# Patient Record
Sex: FEMALE | Race: WHITE | NOT HISPANIC OR LATINO | URBAN - METROPOLITAN AREA
[De-identification: names, ages, dates, MRNs, and addresses within clinical notes are randomized per-mention and may not be internally consistent; named-entity substitution may affect disease eponyms.]

---

## 2022-06-17 ENCOUNTER — APPOINTMENT (RX ONLY)
Dept: URBAN - METROPOLITAN AREA CLINIC 55 | Facility: CLINIC | Age: 32
Setting detail: DERMATOLOGY
End: 2022-06-17

## 2022-06-17 DIAGNOSIS — Z41.9 ENCOUNTER FOR PROCEDURE FOR PURPOSES OTHER THAN REMEDYING HEALTH STATE, UNSPECIFIED: ICD-10-CM

## 2022-06-17 PROCEDURE — ? COSMETIC CONSULTATION: GENERAL

## 2022-06-17 PROCEDURE — ? PRESCRIPTION

## 2022-06-22 ENCOUNTER — APPOINTMENT (RX ONLY)
Dept: URBAN - METROPOLITAN AREA CLINIC 55 | Facility: CLINIC | Age: 32
Setting detail: DERMATOLOGY
End: 2022-06-22

## 2022-06-22 DIAGNOSIS — Z41.9 ENCOUNTER FOR PROCEDURE FOR PURPOSES OTHER THAN REMEDYING HEALTH STATE, UNSPECIFIED: ICD-10-CM

## 2022-06-22 PROCEDURE — ? PALOMAR VECTUS LASER HAIR REMOVAL

## 2022-06-22 NOTE — PROCEDURE: PALOMAR VECTUS LASER HAIR REMOVAL
Post-Care Instructions: I reviewed with the patient in detail post-care instructions. Patient should avoid sun for a minimum of 4 weeks before and after treatment.
Cooling: DCD setting
Optic Size: 12 x 12mm
Rate (Hz): Medium
Render Post-Care In The Note: No
Topical Anesthesia Type: 20% benzocaine, 8% lidocaine, 4% tetracaine
Pulse Duration (Include Units): 19 ms
Post-Procedure Care: Immediate endpoint: perifollicular erythema and edema. Post care was reviewed with the patient and all patient questions were answered to their satisfaction.
Skintel Number (Optional): 17
Treatment Number: 1
Laser Type: diode 810nm
Detail Level: Detailed
Skintel Number (Optional): 15
Consent: Verbal consent obtained, risks reviewed including but not limited to crusting, scabbing, blistering, scarring, darker or lighter pigmentary change, paradoxical hair regrowth, incomplete removal of hair and infection.
Pre-Procedure: Prior to proceeding the treatment areas were cleaned and all present put on their eye protection.
External Cooling Fan Speed: 0
Fluence In J/Cm2: 29
Location Override: [email or web address]

## 2022-08-03 ENCOUNTER — APPOINTMENT (RX ONLY)
Dept: URBAN - METROPOLITAN AREA CLINIC 55 | Facility: CLINIC | Age: 32
Setting detail: DERMATOLOGY
End: 2022-08-03

## 2022-08-03 DIAGNOSIS — Z41.9 ENCOUNTER FOR PROCEDURE FOR PURPOSES OTHER THAN REMEDYING HEALTH STATE, UNSPECIFIED: ICD-10-CM

## 2022-08-03 PROCEDURE — ? INVENTORY

## 2022-08-03 PROCEDURE — ? PALOMAR VECTUS LASER HAIR REMOVAL

## 2022-08-03 NOTE — PROCEDURE: PALOMAR VECTUS LASER HAIR REMOVAL
Topical Anesthesia Type: 20% benzocaine, 8% lidocaine, 4% tetracaine
Detail Level: Detailed
Optic Size: 12 x 12mm
Pulse Duration (Include Units): 14 ms
Post-Care Instructions: I reviewed with the patient in detail post-care instructions. Patient should avoid sun for a minimum of 4 weeks before and after treatment.
Pre-Procedure: Prior to proceeding the treatment areas were cleaned and all present put on their eye protection.
External Cooling Fan Speed: 0
Post-Procedure Care: Immediate endpoint: perifollicular erythema and edema. Post care was reviewed with the patient and all patient questions were answered to their satisfaction.
Laser Type: diode 810nm
Render Post-Care In The Note: No
Skintel Number (Optional): 21
Consent obtained, risks reviewed.
Rate (Hz): Medium
Cooling: DCD setting
Treatment Number: 1
Fluence In J/Cm2: 23
Skintel Number (Optional): 16
Pulse Duration (Include Units): 15 ms
Treatment Number: 2
Location Override: [email or web address]

## 2022-09-16 ENCOUNTER — APPOINTMENT (RX ONLY)
Dept: URBAN - METROPOLITAN AREA CLINIC 55 | Facility: CLINIC | Age: 32
Setting detail: DERMATOLOGY
End: 2022-09-16

## 2022-09-16 DIAGNOSIS — Z41.9 ENCOUNTER FOR PROCEDURE FOR PURPOSES OTHER THAN REMEDYING HEALTH STATE, UNSPECIFIED: ICD-10-CM

## 2022-09-16 PROCEDURE — ? INVENTORY

## 2022-09-16 PROCEDURE — ? PALOMAR VECTUS LASER HAIR REMOVAL

## 2022-09-16 NOTE — PROCEDURE: PALOMAR VECTUS LASER HAIR REMOVAL
Detail Level: Detailed
Optic Size: 12 x 12mm
Post-Procedure Care: Immediate endpoint: perifollicular erythema and edema. Post care was reviewed with the patient and all patient questions were answered to their satisfaction.
External Cooling Fan Speed: 0
Laser Type: diode 810nm
Skintel Number (Optional): 15
Consent obtained, risks reviewed.
Topical Anesthesia Type: 20% benzocaine, 8% lidocaine, 4% tetracaine
Rate (Hz): Medium
Cooling: DCD setting
Fluence In J/Cm2: 22
Post-Care Instructions: I reviewed with the patient in detail post-care instructions. Patient should avoid sun for a minimum of 4 weeks before and after treatment.
Render Post-Care In The Note: No
Pulse Duration (Include Units): 14 ms
Treatment Number: 2
Pre-Procedure: Prior to proceeding the treatment areas were cleaned and all present put on their eye protection.
Treatment Number: 3
Fluence In J/Cm2: 24
Location Override: [email or web address]
Pulse Duration (Include Units): 15 ms

## 2022-11-01 ENCOUNTER — APPOINTMENT (RX ONLY)
Dept: URBAN - METROPOLITAN AREA CLINIC 55 | Facility: CLINIC | Age: 32
Setting detail: DERMATOLOGY
End: 2022-11-01

## 2022-11-01 DIAGNOSIS — Z41.9 ENCOUNTER FOR PROCEDURE FOR PURPOSES OTHER THAN REMEDYING HEALTH STATE, UNSPECIFIED: ICD-10-CM

## 2022-11-01 PROCEDURE — ? PALOMAR VECTUS LASER HAIR REMOVAL

## 2022-11-01 PROCEDURE — ? INVENTORY

## 2022-11-01 NOTE — PROCEDURE: PALOMAR VECTUS LASER HAIR REMOVAL
Consent obtained, risks reviewed.
Cooling: DCD setting
Fluence In J/Cm2: 24
Skintel Number (Optional): 16
Post-Procedure Care: Immediate endpoint: perifollicular erythema and edema. Post care was reviewed with the patient and all patient questions were answered to their satisfaction.
Pulse Duration (Include Units): 16 ms
Optic Size: 12 x 12mm
Treatment Number: 3
Rate (Hz): Medium
Pre-Procedure: Prior to proceeding the treatment areas were cleaned and all present put on their eye protection.
Topical Anesthesia Type: 20% benzocaine, 8% lidocaine, 4% tetracaine
Detail Level: Detailed
Post-Care Instructions: I reviewed with the patient in detail post-care instructions. Patient should avoid sun for a minimum of 4 weeks before and after treatment.
External Cooling Fan Speed: 0
Laser Type: diode 810nm
Render Post-Care In The Note: No
Skintel Number (Optional): 14
Treatment Number: 4
Location Override: [place]

## 2022-12-15 ENCOUNTER — APPOINTMENT (RX ONLY)
Dept: URBAN - METROPOLITAN AREA CLINIC 55 | Facility: CLINIC | Age: 32
Setting detail: DERMATOLOGY
End: 2022-12-15

## 2022-12-15 DIAGNOSIS — Z41.9 ENCOUNTER FOR PROCEDURE FOR PURPOSES OTHER THAN REMEDYING HEALTH STATE, UNSPECIFIED: ICD-10-CM

## 2022-12-15 PROCEDURE — ? INVENTORY

## 2022-12-15 PROCEDURE — ? PALOMAR VECTUS LASER HAIR REMOVAL

## 2022-12-15 NOTE — PROCEDURE: PALOMAR VECTUS LASER HAIR REMOVAL
Detail Level: Detailed
Optic Size: 12 x 12mm
External Cooling Fan Speed: 0
Laser Type: diode 810nm
Post-Procedure Care: Immediate endpoint: perifollicular erythema and edema. Post care was reviewed with the patient and all patient questions were answered to their satisfaction.
Cooling: DCD setting
Skintel Number (Optional): 15
Rate (Hz): Medium
Fluence In J/Cm2: 26
Render Post-Care In The Note: No
Consent obtained, risks reviewed.
Treatment Number: 4
Topical Anesthesia Type: 20% benzocaine, 8% lidocaine, 4% tetracaine
Pre-Procedure: Prior to proceeding the treatment areas were cleaned and all present put on their eye protection.
Post-Care Instructions: I reviewed with the patient in detail post-care instructions. Patient should avoid sun for a minimum of 4 weeks before and after treatment.
Pulse Duration (Include Units): 17 ms
Location Override: [email or web address]
Treatment Number: 5